# Patient Record
Sex: FEMALE | Race: WHITE | NOT HISPANIC OR LATINO | Employment: STUDENT | URBAN - METROPOLITAN AREA
[De-identification: names, ages, dates, MRNs, and addresses within clinical notes are randomized per-mention and may not be internally consistent; named-entity substitution may affect disease eponyms.]

---
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
## 2019-01-16 DIAGNOSIS — R53.83 FATIGUE: ICD-10-CM

## 2019-01-16 LAB
FERRITIN SERPL-MCNC: 44 NG/ML (ref 12–150)
HGB BLD-MCNC: 12.9 G/DL (ref 11.7–15.7)

## 2019-10-31 DIAGNOSIS — R53.83 FATIGUE, UNSPECIFIED TYPE: Primary | ICD-10-CM

## 2019-11-03 ENCOUNTER — HEALTH MAINTENANCE LETTER (OUTPATIENT)
Age: 19
End: 2019-11-03

## 2019-11-04 DIAGNOSIS — R53.83 FATIGUE, UNSPECIFIED TYPE: ICD-10-CM

## 2019-11-04 LAB
FERRITIN SERPL-MCNC: 40 NG/ML (ref 12–150)
HGB BLD-MCNC: 14.2 G/DL (ref 11.7–15.7)

## 2020-09-11 DIAGNOSIS — Z11.59 SPECIAL SCREENING EXAMINATION FOR VIRAL DISEASE: ICD-10-CM

## 2020-09-12 LAB
SARS-COV-2 RNA SPEC QL NAA+PROBE: NOT DETECTED
SPECIMEN SOURCE: NORMAL

## 2020-09-13 DIAGNOSIS — Z20.822 COVID-19 RULED OUT: Primary | ICD-10-CM

## 2020-09-13 LAB
COVID-19 ANTIBODY IGG: NEGATIVE
LAB TEST METHOD: NORMAL
SARS-COV-2 RNA SPEC QL NAA+PROBE: NOT DETECTED
SPECIMEN SOURCE: NORMAL

## 2020-11-16 ENCOUNTER — HEALTH MAINTENANCE LETTER (OUTPATIENT)
Age: 20
End: 2020-11-16

## 2021-01-15 ENCOUNTER — HEALTH MAINTENANCE LETTER (OUTPATIENT)
Age: 21
End: 2021-01-15

## 2021-03-17 ENCOUNTER — OFFICE VISIT (OUTPATIENT)
Dept: ORTHOPEDICS | Facility: CLINIC | Age: 21
End: 2021-03-17
Payer: COMMERCIAL

## 2021-03-17 VITALS
HEIGHT: 66 IN | DIASTOLIC BLOOD PRESSURE: 80 MMHG | HEART RATE: 75 BPM | BODY MASS INDEX: 21.53 KG/M2 | SYSTOLIC BLOOD PRESSURE: 119 MMHG | WEIGHT: 134 LBS

## 2021-03-17 DIAGNOSIS — R53.83 FATIGUE, UNSPECIFIED TYPE: Primary | ICD-10-CM

## 2021-03-17 DIAGNOSIS — R53.83 FATIGUE, UNSPECIFIED TYPE: ICD-10-CM

## 2021-03-17 LAB
BASOPHILS # BLD AUTO: 0 10E9/L (ref 0–0.2)
BASOPHILS NFR BLD AUTO: 0.6 %
DIFFERENTIAL METHOD BLD: NORMAL
EOSINOPHIL # BLD AUTO: 0.1 10E9/L (ref 0–0.7)
EOSINOPHIL NFR BLD AUTO: 1.5 %
ERYTHROCYTE [DISTWIDTH] IN BLOOD BY AUTOMATED COUNT: 12.1 % (ref 10–15)
FERRITIN SERPL-MCNC: 58 NG/ML (ref 12–150)
HCT VFR BLD AUTO: 37.7 % (ref 35–47)
HGB BLD-MCNC: 12.9 G/DL (ref 11.7–15.7)
IMM GRANULOCYTES # BLD: 0 10E9/L (ref 0–0.4)
IMM GRANULOCYTES NFR BLD: 0.2 %
LYMPHOCYTES # BLD AUTO: 2.2 10E9/L (ref 0.8–5.3)
LYMPHOCYTES NFR BLD AUTO: 40.6 %
MCH RBC QN AUTO: 30 PG (ref 26.5–33)
MCHC RBC AUTO-ENTMCNC: 34.2 G/DL (ref 31.5–36.5)
MCV RBC AUTO: 88 FL (ref 78–100)
MONOCYTES # BLD AUTO: 0.4 10E9/L (ref 0–1.3)
MONOCYTES NFR BLD AUTO: 7.5 %
NEUTROPHILS # BLD AUTO: 2.7 10E9/L (ref 1.6–8.3)
NEUTROPHILS NFR BLD AUTO: 49.6 %
NRBC # BLD AUTO: 0 10*3/UL
NRBC BLD AUTO-RTO: 0 /100
PLATELET # BLD AUTO: 243 10E9/L (ref 150–450)
RBC # BLD AUTO: 4.3 10E12/L (ref 3.8–5.2)
TSH SERPL DL<=0.005 MIU/L-ACNC: 1.5 MU/L (ref 0.4–4)
WBC # BLD AUTO: 5.3 10E9/L (ref 4–11)

## 2021-03-17 PROCEDURE — 36415 COLL VENOUS BLD VENIPUNCTURE: CPT | Performed by: PATHOLOGY

## 2021-03-17 PROCEDURE — 84443 ASSAY THYROID STIM HORMONE: CPT | Performed by: PATHOLOGY

## 2021-03-17 PROCEDURE — 85025 COMPLETE CBC W/AUTO DIFF WBC: CPT | Performed by: PATHOLOGY

## 2021-03-17 PROCEDURE — 82728 ASSAY OF FERRITIN: CPT | Performed by: PATHOLOGY

## 2021-03-17 ASSESSMENT — MIFFLIN-ST. JEOR: SCORE: 1389.57

## 2021-03-17 NOTE — PROGRESS NOTES
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 No change to her training regime. Feels fatigued in general, but especially earlier into a run than usual. No associated symptoms: no fever, chest pain, SOB, palpitations, bowel or bladder changes, rash, visual changes. Appetite ok - attending to nutrition - does not restrict any food groups - no concerns with body image. Met with nutritionist last fall. Thinks she is getting adequate hydration. Only gets 7 hours of sleep per night (due to homework), usually sleeps well. No FHx thyroid disorder. Periods normal on OCP taken for acne. Just switched this to new OCP in December and it has worked better for her acne. Not sure which one she takes.          Medications:     Current Outpatient Medications   Medication Sig     cetirizine (ZYRTEC) 10 MG tablet Take 10 mg by mouth daily     clindamycin (CLINDAMAX) 1 % topical gel Apply topically 2 times daily     OMEPRAZOLE PO Take by mouth as needed     No current facility-administered medications for this visit.              Allergies:      Allergies   Allergen Reactions     Pseudoephedrine      Causes hyperactivity     Strawberries [Strawberry] Rash            Review of Systems:   A comprehensive 10 point review of systems (constitutional, ENT, cardiac, peripheral vascular, respiratory, GI, , Musculoskeletal, skin, Neurological) was performed and found to be negative except as described in this note.           Physical Exam:   COMPLETE EXAMINATION:   VITAL SIGNS: /80   Pulse 75   Ht 1.676 m (5' 6\")   Wt 60.8 kg (134 lb)   BMI 21.63 kg/m    GEN: Alert, well-nourished, and in no distress.   HEENT:   Head: Normocephalic and atraumatic.   Eyes: PERRLA, EOMI  Nose: no congestion or " discharge  Mouth/Throat: MMM, No erythema or exudate.   Neck: supple, no lymphadenopathy, + fullness in anterior neck which may be normal soft tissues vs thyromegaly, but no palpable thyroid nodules  CV: S1S2 normal, RRR, no murmur  CHEST: CTA, Easy effort, No rales or wheezes  ABD: Soft. Nontender/nondistended, no HSM/mass, no rebound/guarding.  NEURO: Alert and oriented to person, place, and time. Gait normal.  SKIN: No rash, warmth or erythema  PSYCH: Mood, memory, affect and judgment normal.           Assessment:     Fatigue - likely due to overtraining syndrome, r/o metabolic abnormality        Plan:     1. Obtain screening labs: CBC, ferritin, TSH, vit D  2. Ensure hydration, nutrition and sleep. Increase to 8 hours sleep per night.  3. Reconnect with nutritionist.  4. Ensure adequate rest days. Follow up prn.    Lilia Burnette M.D.    ARETHA Sood was present for the entire visit.

## 2021-03-17 NOTE — LETTER
Date:March 18, 2021      Patient was self referred, no letter generated. Do not send.        [facility] Health Information

## 2021-03-17 NOTE — LETTER
"  3/17/2021      RE: Anita Constantino  [address] [address]       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 No change to her training regime. Feels fatigued in general, but especially earlier into a run than usual. No associated symptoms: no fever, chest pain, SOB, palpitations, bowel or bladder changes, rash, visual changes. Appetite ok - attending to nutrition - does not restrict any food groups - no concerns with body image. Met with nutritionist last fall. Thinks she is getting adequate hydration. Only gets 7 hours of sleep per night (due to homework), usually sleeps well. No FHx thyroid disorder. Periods normal on OCP taken for acne. Just switched this to new OCP in December and it has worked better for her acne. Not sure which one she takes.          Medications:     Current Outpatient Medications   Medication Sig     cetirizine (ZYRTEC) 10 MG tablet Take 10 mg by mouth daily     clindamycin (CLINDAMAX) 1 % topical gel Apply topically 2 times daily     OMEPRAZOLE PO Take by mouth as needed     No current facility-administered medications for this visit.              Allergies:      Allergies   Allergen Reactions     Pseudoephedrine      Causes hyperactivity     Strawberries [Strawberry] Rash            Review of Systems:   A comprehensive 10 point review of systems (constitutional, ENT, cardiac, peripheral vascular, respiratory, GI, , Musculoskeletal, skin, Neurological) was performed and found to be negative except as described in this note.           Physical Exam:   COMPLETE EXAMINATION:   VITAL SIGNS: /80   Pulse 75   Ht 1.676 m (5' 6\")   Wt 60.8 kg (134 lb)   BMI 21.63 kg/m    GEN: Alert, well-nourished, and in no distress. "   HEENT:   Head: Normocephalic and atraumatic.   Eyes: PERRLA, EOMI  Nose: no congestion or discharge  Mouth/Throat: MMM, No erythema or exudate.   Neck: supple, no lymphadenopathy, + fullness in anterior neck which may be normal soft tissues vs thyromegaly, but no palpable thyroid nodules  CV: S1S2 normal, RRR, no murmur  CHEST: CTA, Easy effort, No rales or wheezes  ABD: Soft. Nontender/nondistended, no HSM/mass, no rebound/guarding.  NEURO: Alert and oriented to person, place, and time. Gait normal.  SKIN: No rash, warmth or erythema  PSYCH: Mood, memory, affect and judgment normal.           Assessment:     Fatigue - likely due to overtraining syndrome, r/o metabolic abnormality        Plan:     1. Obtain screening labs: CBC, ferritin, TSH, vit D  2. Ensure hydration, nutrition and sleep. Increase to 8 hours sleep per night.  3. Reconnect with nutritionist.  4. Ensure adequate rest days. Follow up prn.    Lilia Burnette M.D.    ARETHA Sood was present for the entire visit.        Lilia Burnette MD

## 2021-03-18 LAB — DEPRECATED CALCIDIOL+CALCIFEROL SERPL-MC: 47 UG/L (ref 20–75)

## 2021-04-09 ENCOUNTER — OFFICE VISIT (OUTPATIENT)
Dept: FAMILY MEDICINE | Facility: CLINIC | Age: 21
End: 2021-04-09
Payer: COMMERCIAL

## 2021-04-09 VITALS
DIASTOLIC BLOOD PRESSURE: 74 MMHG | WEIGHT: 132 LBS | SYSTOLIC BLOOD PRESSURE: 120 MMHG | HEART RATE: 75 BPM | HEIGHT: 66 IN | BODY MASS INDEX: 21.21 KG/M2

## 2021-04-09 DIAGNOSIS — M72.2 PLANTAR FASCIITIS: ICD-10-CM

## 2021-04-09 DIAGNOSIS — M76.822 POSTERIOR TIBIAL TENDINITIS, LEFT: Primary | ICD-10-CM

## 2021-04-09 RX ORDER — DICLOFENAC SODIUM 75 MG/1
75 TABLET, DELAYED RELEASE ORAL 2 TIMES DAILY
Qty: 40 TABLET | Refills: 0 | Status: SHIPPED | OUTPATIENT
Start: 2021-04-09

## 2021-04-09 ASSESSMENT — MIFFLIN-ST. JEOR: SCORE: 1380.5

## 2021-04-09 NOTE — PROGRESS NOTES
HISTORY OF PRESENT ILLNESS  Ms. Constantino is a pleasant 20 year old year old female who presents to clinic today with left foot pain  Anita explains that about a week ago she started having left arch pain with her running   Has been running in spikes more often with competition  Typically wears superfeet in her shoes  Location: left foot  Quality:  achy pain    Severity: 5/10 at worst    Duration: about a week  Timing: occurs intermittently  Context: occurs while running  Modifying factors:  resting and non-use makes it better, movement and use makes it worse  Associated signs & symptoms: some swelling  MEDICAL HISTORY  There is no problem list on file for this patient.      Current Outpatient Medications   Medication Sig Dispense Refill     cetirizine (ZYRTEC) 10 MG tablet Take 10 mg by mouth daily       clindamycin (CLINDAMAX) 1 % topical gel Apply topically 2 times daily       OMEPRAZOLE PO Take by mouth as needed         Allergies   Allergen Reactions     Pseudoephedrine      Causes hyperactivity     Strawberries [[place]] Rash       Family History   Problem Relation Age of Onset     Hypertension Mother      Hyperlipidemia Mother      Asthma Father      Hyperlipidemia Maternal Grandmother      Skin Cancer Maternal Grandmother      Alzheimer Disease Maternal Grandfather      Pacemaker Paternal Grandmother      Pacemaker Paternal Grandfather      Social History     Socioeconomic History     Marital status: Single     Spouse name: None     Number of children: None     Years of education: None     Highest education level: None   Occupational History     None   Social Needs     Financial resource strain: None     Food insecurity     Worry: None     Inability: None     Transportation needs     Medical: None     Non-medical: None   Tobacco Use     Smoking status: Never Smoker     Smokeless tobacco: Never Used   Substance and Sexual Activity     Alcohol use: No     Drug use: No     Sexual activity: Never   Lifestyle  "    Physical activity     Days per week: None     Minutes per session: None     Stress: None   Relationships     Social connections     Talks on phone: None     Gets together: None     Attends [employer] service: None     Active member of club or organization: None     Attends meetings of clubs or organizations: None     Relationship status: None     Intimate partner violence     Fear of current or ex partner: None     Emotionally abused: None     Physically abused: None     Forced sexual activity: None   Other Topics Concern     None   Social History Narrative     None       Additional medical/Social/Surgical histories reviewed in EPIC and updated as appropriate.     REVIEW OF SYSTEMS (4/9/2021)  10 point ROS of systems including Constitutional, Eyes, Respiratory, Cardiovascular, Gastroenterology, Genitourinary, Integumentary, Musculoskeletal, Psychiatric, Allergic/Immunologic were all negative except for pertinent positives noted in my HPI.     PHYSICAL EXAM  Vitals:    04/09/21 1036   BP: 120/74   Pulse: 75   Weight: 59.9 kg (132 lb)   Height: 1.676 m (5' 6\")     Vital Signs: /74   Pulse 75   Ht 1.676 m (5' 6\")   Wt 59.9 kg (132 lb)   BMI 21.31 kg/m   Patient declined being weighed. Body mass index is 21.31 kg/m .    General  - normal appearance, in no obvious distress  HEENT  - conjunctivae not injected, moist mucous membranes, normocephalic/atraumatic head, ears normal appearance, no lesions, mouth normal appearance, no scars, normal dentition and teeth present  CV  - normal pulses at posterior tib and dorsalis pedis  Pulm  - normal respiratory pattern, non-labored  Musculoskeletal - left foot  - stance: normal gait without limp, normal stance without excessive pronation, normal heel inversion with standing heel raise, no obvious leg length discrepancy, normal heel and toe walk  - inspection: no swelling or effusion,  normal bone and joint alignment, no obvious deformity, has high arches bilaterally  - " palpation: no bony or soft tissue tenderness, except over posterior tibial tendons at arch and some at plantar fascia  - ROM: normal active and passive ROM of great and lesser toes, no pain with MT translation  - strength: 5/5 in all planes  Neuro  - no sensory or motor deficit, grossly normal coordination, normal muscle tone  Skin  - no ecchymosis, erythema, warmth, or induration, no obvious rash  Psych  - interactive, appropriate, normal mood and affect  ASSESSMENT & PLAN  19 yo female with left foot plantar fasciitis and posterior tibial tendinitis  Reviewed plan to use some taping and arch supports for spikes and running shoes  Ice pRN  Lidocaine patches PRN  voltaren bid PRN  F/u 2 weeks consider xrays if not improving    Appropriate PPE was utilized for prevention of spread of Covid-19.  Ananth Soliz MD, CAQSM

## 2021-04-09 NOTE — LETTER
4/9/2021      RE: Anita Constantino  [address]  [facility] [address]       HISTORY OF PRESENT ILLNESS  Ms. Constantino is a pleasant 20 year old year old female who presents to clinic today with left foot pain  Anita explains that about a week ago she started having left arch pain with her running   Has been running in spikes more often with competition  Typically wears superfeet in her shoes  Location: left foot  Quality:  achy pain    Severity: 5/10 at worst    Duration: about a week  Timing: occurs intermittently  Context: occurs while running  Modifying factors:  resting and non-use makes it better, movement and use makes it worse  Associated signs & symptoms: some swelling  MEDICAL HISTORY  There is no problem list on file for this patient.      Current Outpatient Medications   Medication Sig Dispense Refill     cetirizine (ZYRTEC) 10 MG tablet Take 10 mg by mouth daily       clindamycin (CLINDAMAX) 1 % topical gel Apply topically 2 times daily       OMEPRAZOLE PO Take by mouth as needed         Allergies   Allergen Reactions     Pseudoephedrine      Causes hyperactivity     Strawberries [[place]] Rash       Family History   Problem Relation Age of Onset     Hypertension Mother      Hyperlipidemia Mother      Asthma Father      Hyperlipidemia Maternal Grandmother      Skin Cancer Maternal Grandmother      Alzheimer Disease Maternal Grandfather      Pacemaker Paternal Grandmother      Pacemaker Paternal Grandfather      Social History     Socioeconomic History     Marital status: Single     Spouse name: None     Number of children: None     Years of education: None     Highest education level: None   Occupational History     None   Social Needs     Financial resource strain: None     Food insecurity     Worry: None     Inability: None     Transportation needs     Medical: None     Non-medical: None   Tobacco Use     Smoking status: Never Smoker     Smokeless tobacco: Never Used   Substance  "and Sexual Activity     Alcohol use: No     Drug use: No     Sexual activity: Never   Lifestyle     Physical activity     Days per week: None     Minutes per session: None     Stress: None   Relationships     Social connections     Talks on phone: None     Gets together: None     Attends [employer] service: None     Active member of club or organization: None     Attends meetings of clubs or organizations: None     Relationship status: None     Intimate partner violence     Fear of current or ex partner: None     Emotionally abused: None     Physically abused: None     Forced sexual activity: None   Other Topics Concern     None   Social History Narrative     None       Additional medical/Social/Surgical histories reviewed in Psychiatric and updated as appropriate.     REVIEW OF SYSTEMS (4/9/2021)  10 point ROS of systems including Constitutional, Eyes, Respiratory, Cardiovascular, Gastroenterology, Genitourinary, Integumentary, Musculoskeletal, Psychiatric, Allergic/Immunologic were all negative except for pertinent positives noted in my HPI.     PHYSICAL EXAM  Vitals:    04/09/21 1036   BP: 120/74   Pulse: 75   Weight: 59.9 kg (132 lb)   Height: 1.676 m (5' 6\")     Vital Signs: /74   Pulse 75   Ht 1.676 m (5' 6\")   Wt 59.9 kg (132 lb)   BMI 21.31 kg/m   Patient declined being weighed. Body mass index is 21.31 kg/m .    General  - normal appearance, in no obvious distress  HEENT  - conjunctivae not injected, moist mucous membranes, normocephalic/atraumatic head, ears normal appearance, no lesions, mouth normal appearance, no scars, normal dentition and teeth present  CV  - normal pulses at posterior tib and dorsalis pedis  Pulm  - normal respiratory pattern, non-labored  Musculoskeletal - left foot  - stance: normal gait without limp, normal stance without excessive pronation, normal heel inversion with standing heel raise, no obvious leg length discrepancy, normal heel and toe walk  - inspection: no swelling or " effusion,  normal bone and joint alignment, no obvious deformity, has high arches bilaterally  - palpation: no bony or soft tissue tenderness, except over posterior tibial tendons at arch and some at plantar fascia  - ROM: normal active and passive ROM of great and lesser toes, no pain with MT translation  - strength: 5/5 in all planes  Neuro  - no sensory or motor deficit, grossly normal coordination, normal muscle tone  Skin  - no ecchymosis, erythema, warmth, or induration, no obvious rash  Psych  - interactive, appropriate, normal mood and affect  ASSESSMENT & PLAN  21 yo female with left foot plantar fasciitis and posterior tibial tendinitis  Reviewed plan to use some taping and arch supports for spikes and running shoes  Ice pRN  Lidocaine patches PRN  voltaren bid PRN  F/u 2 weeks consider xrays if not improving    Appropriate PPE was utilized for prevention of spread of Covid-19.  Ananth Soliz MD, CAQSM

## 2021-04-23 ENCOUNTER — OFFICE VISIT (OUTPATIENT)
Dept: FAMILY MEDICINE | Facility: CLINIC | Age: 21
End: 2021-04-23
Payer: COMMERCIAL

## 2021-04-23 ENCOUNTER — ANCILLARY PROCEDURE (OUTPATIENT)
Dept: GENERAL RADIOLOGY | Facility: CLINIC | Age: 21
End: 2021-04-23
Attending: PREVENTIVE MEDICINE
Payer: COMMERCIAL

## 2021-04-23 VITALS
HEART RATE: 69 BPM | SYSTOLIC BLOOD PRESSURE: 122 MMHG | BODY MASS INDEX: 20.91 KG/M2 | WEIGHT: 133.2 LBS | DIASTOLIC BLOOD PRESSURE: 77 MMHG | HEIGHT: 67 IN

## 2021-04-23 DIAGNOSIS — M79.672 LEFT FOOT PAIN: ICD-10-CM

## 2021-04-23 DIAGNOSIS — M76.822 POSTERIOR TIBIAL TENDINITIS OF LEFT LOWER EXTREMITY: ICD-10-CM

## 2021-04-23 DIAGNOSIS — M79.672 LEFT FOOT PAIN: Primary | ICD-10-CM

## 2021-04-23 PROCEDURE — 73630 X-RAY EXAM OF FOOT: CPT | Mod: LT | Performed by: RADIOLOGY

## 2021-04-23 RX ORDER — METHYLPREDNISOLONE 4 MG
TABLET, DOSE PACK ORAL
Qty: 21 TABLET | Refills: 0 | Status: SHIPPED | OUTPATIENT
Start: 2021-04-23

## 2021-04-23 ASSESSMENT — MIFFLIN-ST. JEOR: SCORE: 1401.82

## 2021-04-23 NOTE — LETTER
4/23/2021      RE: Anita Constantino  [address] [facility] [address]       HISTORY OF PRESENT ILLNESS  Ms. Constantino is a pleasant 20 year old year old female who presents to clinic today with ongoing left foot and ankle pain  Anita explains that she has been using voltaren that helps a little, reduced her running, but continues to have pain in the ankle/foot  Location: left foot/ankle  Quality:  achy pain    Severity: 6/10 at worst    Duration: over a month  Timing: occurs intermittently  Context: occurs while exercising and lifting  Modifying factors:  resting and non-use makes it better, movement and use makes it worse  Associated signs & symptoms: still having swelling at times    MEDICAL HISTORY  There is no problem list on file for this patient.      Current Outpatient Medications   Medication Sig Dispense Refill     cetirizine (ZYRTEC) 10 MG tablet Take 10 mg by mouth daily       clindamycin (CLINDAMAX) 1 % topical gel Apply topically 2 times daily       diclofenac (VOLTAREN) 75 MG EC tablet Take 1 tablet (75 mg) by mouth 2 times daily 40 tablet 0     OMEPRAZOLE PO Take by mouth as needed         Allergies   Allergen Reactions     Pseudoephedrine      Causes hyperactivity     Strawberries [[place]] Rash       Family History   Problem Relation Age of Onset     Hypertension Mother      Hyperlipidemia Mother      Asthma Father      Hyperlipidemia Maternal Grandmother      Skin Cancer Maternal Grandmother      Alzheimer Disease Maternal Grandfather      Pacemaker Paternal Grandmother      Pacemaker Paternal Grandfather      Social History     Socioeconomic History     Marital status: Single     Spouse name: Not on file     Number of children: Not on file     Years of education: Not on file     Highest education level: Not on file   Occupational History     Not on file   Social Needs     Financial resource strain: Not on file     Food insecurity     Worry: Not on file     Inability: Not on  "file     Transportation needs     Medical: Not on file     Non-medical: Not on file   Tobacco Use     Smoking status: Never Smoker     Smokeless tobacco: Never Used   Substance and Sexual Activity     Alcohol use: No     Drug use: No     Sexual activity: Never   Lifestyle     Physical activity     Days per week: Not on file     Minutes per session: Not on file     Stress: Not on file   Relationships     Social connections     Talks on phone: Not on file     Gets together: Not on file     Attends Pentecostalism service: Not on file     Active member of club or organization: Not on file     Attends meetings of clubs or organizations: Not on file     Relationship status: Not on file     Intimate partner violence     Fear of current or ex partner: Not on file     Emotionally abused: Not on file     Physically abused: Not on file     Forced sexual activity: Not on file   Other Topics Concern     Not on file   Social History Narrative     Not on file       Additional medical/Social/Surgical histories reviewed in [facility] and updated as appropriate.     REVIEW OF SYSTEMS (4/23/2021)  10 point ROS of systems including Constitutional, Eyes, Respiratory, Cardiovascular, Gastroenterology, Genitourinary, Integumentary, Musculoskeletal, Psychiatric, Allergic/Immunologic were all negative except for pertinent positives noted in my HPI.     PHYSICAL EXAM  Vitals:    04/23/21 0834   BP: 122/77   Pulse: 69   Weight: 60.4 kg (133 lb 3.2 oz)   Height: 1.702 m (5' 7\")     Vital Signs: /77   Pulse 69   Ht 1.702 m (5' 7\")   Wt 60.4 kg (133 lb 3.2 oz)   BMI 20.86 kg/m   Patient declined being weighed. Body mass index is 20.86 kg/m .    General  - normal appearance, in no obvious distress  HEENT  - conjunctivae not injected, moist mucous membranes, normocephalic/atraumatic head, ears normal appearance, no lesions, mouth normal appearance, no scars, normal dentition and teeth present  CV  - normal pulses at posterior tib and dorsalis " pedis  Pulm  - normal respiratory pattern, non-labored  Musculoskeletal - left ankle  - stance: gait does not favors affected side  - inspection: no swelling laterally, normal bone and joint alignment, no obvious deformity  - palpation: tenderness over posterior tibial tendon to palpation and midfoot, no tenderness over lateral or medial malleoli, navicular, or base of 5th MT  - ROM: intact globally but NOT limited secondary to pain  - strength: 4/5 in eversion, 5/5 in all other planes  - special tests:  pain with eversion stress  (-) anterior drawer  (-) talar tilt  (-) Tinel's  (-) squeeze test  (-) Wright test  Neuro  - no sensory or motor deficit, grossly normal coordination, normal muscle tone  Skin  - no ecchymosis overlying lateral foot-ankle junction, no warmth or induration, no obvious rash  Psych  - interactive, appropriate, normal mood and affect  ASSESSMENT & PLAN  21 yo female with posterior tibial tendinitis, and midfoot pain  Reviewed plan to use medrol pack  Ordered xrays foot  F/u in 1-2 weeks  Will consider injection for posterior tibial tendon  Appropriate PPE was utilized for prevention of spread of Covid-19.  Ananth Soliz MD, CAQSM        Ananth Soliz MD

## 2021-04-23 NOTE — PROGRESS NOTES
HISTORY OF PRESENT ILLNESS  Ms. Constantino is a pleasant 20 year old year old female who presents to clinic today with ongoing left foot and ankle pain  Anita explains that she has been using voltaren that helps a little, reduced her running, but continues to have pain in the ankle/foot  Location: left foot/ankle  Quality:  achy pain    Severity: 6/10 at worst    Duration: over a month  Timing: occurs intermittently  Context: occurs while exercising and lifting  Modifying factors:  resting and non-use makes it better, movement and use makes it worse  Associated signs & symptoms: still having swelling at times    MEDICAL HISTORY  There is no problem list on file for this patient.      Current Outpatient Medications   Medication Sig Dispense Refill     cetirizine (ZYRTEC) 10 MG tablet Take 10 mg by mouth daily       clindamycin (CLINDAMAX) 1 % topical gel Apply topically 2 times daily       diclofenac (VOLTAREN) 75 MG EC tablet Take 1 tablet (75 mg) by mouth 2 times daily 40 tablet 0     OMEPRAZOLE PO Take by mouth as needed         Allergies   Allergen Reactions     Pseudoephedrine      Causes hyperactivity     Strawberries [[place]] Rash       Family History   Problem Relation Age of Onset     Hypertension Mother      Hyperlipidemia Mother      Asthma Father      Hyperlipidemia Maternal Grandmother      Skin Cancer Maternal Grandmother      Alzheimer Disease Maternal Grandfather      Pacemaker Paternal Grandmother      Pacemaker Paternal Grandfather      Social History     Socioeconomic History     Marital status: Single     Spouse name: Not on file     Number of children: Not on file     Years of education: Not on file     Highest education level: Not on file   Occupational History     Not on file   Social Needs     Financial resource strain: Not on file     Food insecurity     Worry: Not on file     Inability: Not on file     Transportation needs     Medical: Not on file     Non-medical: Not on file   Tobacco  "Use     Smoking status: Never Smoker     Smokeless tobacco: Never Used   Substance and Sexual Activity     Alcohol use: No     Drug use: No     Sexual activity: Never   Lifestyle     Physical activity     Days per week: Not on file     Minutes per session: Not on file     Stress: Not on file   Relationships     Social connections     Talks on phone: Not on file     Gets together: Not on file     Attends Jainism service: Not on file     Active member of club or organization: Not on file     Attends meetings of clubs or organizations: Not on file     Relationship status: Not on file     Intimate partner violence     Fear of current or ex partner: Not on file     Emotionally abused: Not on file     Physically abused: Not on file     Forced sexual activity: Not on file   Other Topics Concern     Not on file   Social History Narrative     Not on file       Additional medical/Social/Surgical histories reviewed in [facility] and updated as appropriate.     REVIEW OF SYSTEMS (4/23/2021)  10 point ROS of systems including Constitutional, Eyes, Respiratory, Cardiovascular, Gastroenterology, Genitourinary, Integumentary, Musculoskeletal, Psychiatric, Allergic/Immunologic were all negative except for pertinent positives noted in my HPI.     PHYSICAL EXAM  Vitals:    04/23/21 0834   BP: 122/77   Pulse: 69   Weight: 60.4 kg (133 lb 3.2 oz)   Height: 1.702 m (5' 7\")     Vital Signs: /77   Pulse 69   Ht 1.702 m (5' 7\")   Wt 60.4 kg (133 lb 3.2 oz)   BMI 20.86 kg/m   Patient declined being weighed. Body mass index is 20.86 kg/m .    General  - normal appearance, in no obvious distress  HEENT  - conjunctivae not injected, moist mucous membranes, normocephalic/atraumatic head, ears normal appearance, no lesions, mouth normal appearance, no scars, normal dentition and teeth present  CV  - normal pulses at posterior tib and dorsalis pedis  Pulm  - normal respiratory pattern, non-labored  Musculoskeletal - left ankle  - stance: " gait does not favors affected side  - inspection: no swelling laterally, normal bone and joint alignment, no obvious deformity  - palpation: tenderness over posterior tibial tendon to palpation and midfoot, no tenderness over lateral or medial malleoli, navicular, or base of 5th MT  - ROM: intact globally but NOT limited secondary to pain  - strength: 4/5 in eversion, 5/5 in all other planes  - special tests:  pain with eversion stress  (-) anterior drawer  (-) talar tilt  (-) Tinel's  (-) squeeze test  (-) Wright test  Neuro  - no sensory or motor deficit, grossly normal coordination, normal muscle tone  Skin  - no ecchymosis overlying lateral foot-ankle junction, no warmth or induration, no obvious rash  Psych  - interactive, appropriate, normal mood and affect  ASSESSMENT & PLAN  21 yo female with posterior tibial tendinitis, and midfoot pain  Reviewed plan to use medrol pack  Ordered xrays foot  F/u in 1-2 weeks  Will consider injection for posterior tibial tendon  Appropriate PPE was utilized for prevention of spread of Covid-19.  Ananth Soliz MD, CAQSM

## 2021-05-07 ENCOUNTER — OFFICE VISIT (OUTPATIENT)
Dept: FAMILY MEDICINE | Facility: CLINIC | Age: 21
End: 2021-05-07
Payer: COMMERCIAL

## 2021-05-07 VITALS
BODY MASS INDEX: 20.4 KG/M2 | SYSTOLIC BLOOD PRESSURE: 124 MMHG | HEIGHT: 67 IN | HEART RATE: 99 BPM | WEIGHT: 130 LBS | DIASTOLIC BLOOD PRESSURE: 75 MMHG

## 2021-05-07 DIAGNOSIS — M72.2 PLANTAR FASCIITIS: ICD-10-CM

## 2021-05-07 DIAGNOSIS — M76.822 POSTERIOR TIBIAL TENDINITIS, LEFT: Primary | ICD-10-CM

## 2021-05-07 DIAGNOSIS — M79.672 LEFT FOOT PAIN: ICD-10-CM

## 2021-05-07 ASSESSMENT — MIFFLIN-ST. JEOR: SCORE: 1387.31

## 2021-05-07 NOTE — PROGRESS NOTES
HISTORY OF PRESENT ILLNESS  Ms. Constantino is a pleasant 20 year old year old female who presents to clinic today with left foot pain  Anita explains that she has taken medrol pack and this helped a little, but continues to have pain  Using boot 1/2 days  Still has not run  Still has pain with walking  Location: left foot  Quality:  achy pain    Severity: 4/10 at worst    Duration: 6+ weeks    MEDICAL HISTORY  There is no problem list on file for this patient.      Current Outpatient Medications   Medication Sig Dispense Refill     cetirizine (ZYRTEC) 10 MG tablet Take 10 mg by mouth daily       clindamycin (CLINDAMAX) 1 % topical gel Apply topically 2 times daily       diclofenac (VOLTAREN) 75 MG EC tablet Take 1 tablet (75 mg) by mouth 2 times daily 40 tablet 0     methylPREDNISolone (MEDROL DOSEPAK) 4 MG tablet therapy pack Follow Package Directions 21 tablet 0     OMEPRAZOLE PO Take by mouth as needed         Allergies   Allergen Reactions     Pseudoephedrine      Causes hyperactivity     Strawberries [[place]] Rash       Family History   Problem Relation Age of Onset     Hypertension Mother      Hyperlipidemia Mother      Asthma Father      Hyperlipidemia Maternal Grandmother      Skin Cancer Maternal Grandmother      Alzheimer Disease Maternal Grandfather      Pacemaker Paternal Grandmother      Pacemaker Paternal Grandfather      Social History     Socioeconomic History     Marital status: Single     Spouse name: None     Number of children: None     Years of education: None     Highest education level: None   Occupational History     None   Social Needs     Financial resource strain: None     Food insecurity     Worry: None     Inability: None     Transportation needs     Medical: None     Non-medical: None   Tobacco Use     Smoking status: Never Smoker     Smokeless tobacco: Never Used   Substance and Sexual Activity     Alcohol use: No     Drug use: No     Sexual activity: Never   Lifestyle     Physical  "activity     Days per week: None     Minutes per session: None     Stress: None   Relationships     Social connections     Talks on phone: None     Gets together: None     Attends Anabaptism service: None     Active member of club or organization: None     Attends meetings of clubs or organizations: None     Relationship status: None     Intimate partner violence     Fear of current or ex partner: None     Emotionally abused: None     Physically abused: None     Forced sexual activity: None   Other Topics Concern     None   Social History Narrative     None       Additional medical/Social/Surgical histories reviewed in [facility] and updated as appropriate.     REVIEW OF SYSTEMS (5/7/2021)  10 point ROS of systems including Constitutional, Eyes, Respiratory, Cardiovascular, Gastroenterology, Genitourinary, Integumentary, Musculoskeletal, Psychiatric, Allergic/Immunologic were all negative except for pertinent positives noted in my HPI.     PHYSICAL EXAM  Vitals:    05/07/21 1104   BP: 124/75   Pulse: 99   Weight: 59 kg (130 lb)   Height: 1.702 m (5' 7\")     Vital Signs: /75   Pulse 99   Ht 1.702 m (5' 7\")   Wt 59 kg (130 lb)   BMI 20.36 kg/m   Patient declined being weighed. Body mass index is 20.36 kg/m .    General  - normal appearance, in no obvious distress  HEENT  - conjunctivae not injected, moist mucous membranes, normocephalic/atraumatic head, ears normal appearance, no lesions, mouth normal appearance, no scars, normal dentition and teeth present  CV  - normal pulses at posterior tib and dorsalis pedis  Pulm  - normal respiratory pattern, non-labored  Musculoskeletal -left foot/ ankle  - stance: gait does not favors affected side,   - inspection:noswelling laterally, normal bone and joint alignment, no obvious deformity  - palpation: tenderness over medial arch, posterior tibial tendon, no tenderness over lateral or medial malleoli, navicular, or base of 5th MT  - ROM: intact globally but NO limited " secondary to pain  - strength: 5/5 in eversion, 5/5 in all other planes    Neuro  - no sensory or motor deficit, grossly normal coordination, normal muscle tone  Skin  - no ecchymosis overlying lateral foot-ankle junction, no warmth or induration, no obvious rash  Psych  - interactive, appropriate, normal mood and affect  ASSESSMENT & PLAN  21 yo female with posterior tibial tendinitis, left  , not resolved  Reviewed xrays: has high arches  No fractures  Use boot x 10 days  F/u and will discuss MRI  Discussed plan with ATC and athlete    I independently reviewed the following imaging studies:    Appropriate PPE was utilized for prevention of spread of Covid-19.  Ananth Soliz MD, CAQSM

## 2021-05-07 NOTE — LETTER
5/7/2021      RE: Anita Constantino  [address] [facility] [id number]       HISTORY OF PRESENT ILLNESS  Ms. Constantino is a pleasant 20 year old year old female who presents to clinic today with left foot pain  Anita explains that she has taken medrol pack and this helped a little, but continues to have pain  Using boot 1/2 days  Still has not run  Still has pain with walking  Location: left foot  Quality:  achy pain    Severity: 4/10 at worst    Duration: 6+ weeks    MEDICAL HISTORY  There is no problem list on file for this patient.      Current Outpatient Medications   Medication Sig Dispense Refill     cetirizine (ZYRTEC) 10 MG tablet Take 10 mg by mouth daily       clindamycin (CLINDAMAX) 1 % topical gel Apply topically 2 times daily       diclofenac (VOLTAREN) 75 MG EC tablet Take 1 tablet (75 mg) by mouth 2 times daily 40 tablet 0     methylPREDNISolone (MEDROL DOSEPAK) 4 MG tablet therapy pack Follow Package Directions 21 tablet 0     OMEPRAZOLE PO Take by mouth as needed         Allergies   Allergen Reactions     Pseudoephedrine      Causes hyperactivity     Strawberries [[place]] Rash       Family History   Problem Relation Age of Onset     Hypertension Mother      Hyperlipidemia Mother      Asthma Father      Hyperlipidemia Maternal Grandmother      Skin Cancer Maternal Grandmother      Alzheimer Disease Maternal Grandfather      Pacemaker Paternal Grandmother      Pacemaker Paternal Grandfather      Social History     Socioeconomic History     Marital status: Single     Spouse name: None     Number of children: None     Years of education: None     Highest education level: None   Occupational History     None   Social Needs     Financial resource strain: None     Food insecurity     Worry: None     Inability: None     Transportation needs     Medical: None     Non-medical: None   Tobacco Use     Smoking status: Never Smoker     Smokeless tobacco: Never Used   Substance and Sexual  "Activity     Alcohol use: No     Drug use: No     Sexual activity: Never   Lifestyle     Physical activity     Days per week: None     Minutes per session: None     Stress: None   Relationships     Social connections     Talks on phone: None     Gets together: None     Attends Denominational service: None     Active member of club or organization: None     Attends meetings of clubs or organizations: None     Relationship status: None     Intimate partner violence     Fear of current or ex partner: None     Emotionally abused: None     Physically abused: None     Forced sexual activity: None   Other Topics Concern     None   Social History Narrative     None       Additional medical/Social/Surgical histories reviewed in [facility] and updated as appropriate.     REVIEW OF SYSTEMS (5/7/2021)  10 point ROS of systems including Constitutional, Eyes, Respiratory, Cardiovascular, Gastroenterology, Genitourinary, Integumentary, Musculoskeletal, Psychiatric, Allergic/Immunologic were all negative except for pertinent positives noted in my HPI.     PHYSICAL EXAM  Vitals:    05/07/21 1104   BP: 124/75   Pulse: 99   Weight: 59 kg (130 lb)   Height: 1.702 m (5' 7\")     Vital Signs: /75   Pulse 99   Ht 1.702 m (5' 7\")   Wt 59 kg (130 lb)   BMI 20.36 kg/m   Patient declined being weighed. Body mass index is 20.36 kg/m .    General  - normal appearance, in no obvious distress  HEENT  - conjunctivae not injected, moist mucous membranes, normocephalic/atraumatic head, ears normal appearance, no lesions, mouth normal appearance, no scars, normal dentition and teeth present  CV  - normal pulses at posterior tib and dorsalis pedis  Pulm  - normal respiratory pattern, non-labored  Musculoskeletal -left foot/ ankle  - stance: gait does not favors affected side,   - inspection:noswelling laterally, normal bone and joint alignment, no obvious deformity  - palpation: tenderness over medial arch, posterior tibial tendon, no tenderness over " lateral or medial malleoli, navicular, or base of 5th MT  - ROM: intact globally but NO limited secondary to pain  - strength: 5/5 in eversion, 5/5 in all other planes    Neuro  - no sensory or motor deficit, grossly normal coordination, normal muscle tone  Skin  - no ecchymosis overlying lateral foot-ankle junction, no warmth or induration, no obvious rash  Psych  - interactive, appropriate, normal mood and affect  ASSESSMENT & PLAN  21 yo female with posterior tibial tendinitis, left  , not resolved  Reviewed xrays: has high arches  No fractures  Use boot x 10 days  F/u and will discuss MRI  Discussed plan with ATC and athlete    I independently reviewed the following imaging studies:    Appropriate PPE was utilized for prevention of spread of Covid-19.  Ananth Soliz MD, CAQSM        Ananth Soliz MD

## 2021-06-04 ENCOUNTER — OFFICE VISIT (OUTPATIENT)
Dept: FAMILY MEDICINE | Facility: CLINIC | Age: 21
End: 2021-06-04
Payer: COMMERCIAL

## 2021-06-04 VITALS
WEIGHT: 129 LBS | DIASTOLIC BLOOD PRESSURE: 74 MMHG | SYSTOLIC BLOOD PRESSURE: 107 MMHG | BODY MASS INDEX: 20.25 KG/M2 | HEART RATE: 96 BPM | HEIGHT: 67 IN

## 2021-06-04 DIAGNOSIS — M79.672 LEFT FOOT PAIN: ICD-10-CM

## 2021-06-04 DIAGNOSIS — M76.822 POSTERIOR TIBIAL TENDINITIS, LEFT: Primary | ICD-10-CM

## 2021-06-04 ASSESSMENT — MIFFLIN-ST. JEOR: SCORE: 1382.77

## 2021-06-04 NOTE — LETTER
6/4/2021      RE: Anita Constantino  [address] [facility] [address]       HISTORY OF PRESENT ILLNESS  Ms. Constantino is a pleasant 20 year old year old female who presents to clinic today with ongoing left foot pain  Anita explains that she has had some improvement in her pain since the season ended, but still has pain when she tries running and with workouts        MEDICAL HISTORY  There is no problem list on file for this patient.      Current Outpatient Medications   Medication Sig Dispense Refill     cetirizine (ZYRTEC) 10 MG tablet Take 10 mg by mouth daily       OMEPRAZOLE PO Take by mouth as needed       clindamycin (CLINDAMAX) 1 % topical gel Apply topically 2 times daily       diclofenac (VOLTAREN) 75 MG EC tablet Take 1 tablet (75 mg) by mouth 2 times daily (Patient not taking: Reported on 6/4/2021) 40 tablet 0     methylPREDNISolone (MEDROL DOSEPAK) 4 MG tablet therapy pack Follow Package Directions (Patient not taking: Reported on 6/4/2021) 21 tablet 0       Allergies   Allergen Reactions     Pseudoephedrine      Causes hyperactivity     Strawberries [[place]] Rash       Family History   Problem Relation Age of Onset     Hypertension Mother      Hyperlipidemia Mother      Asthma Father      Hyperlipidemia Maternal Grandmother      Skin Cancer Maternal Grandmother      Alzheimer Disease Maternal Grandfather      Pacemaker Paternal Grandmother      Pacemaker Paternal Grandfather      Social History     Socioeconomic History     Marital status: Single     Spouse name: Not on file     Number of children: Not on file     Years of education: Not on file     Highest education level: Not on file   Occupational History     Not on file   Social Needs     Financial resource strain: Not on file     Food insecurity     Worry: Not on file     Inability: Not on file     Transportation needs     Medical: Not on file     Non-medical: Not on file   Tobacco Use     Smoking status: Never Smoker      "Smokeless tobacco: Never Used   Substance and Sexual Activity     Alcohol use: No     Drug use: No     Sexual activity: Never   Lifestyle     Physical activity     Days per week: Not on file     Minutes per session: Not on file     Stress: Not on file   Relationships     Social connections     Talks on phone: Not on file     Gets together: Not on file     Attends Religion service: Not on file     Active member of club or organization: Not on file     Attends meetings of clubs or organizations: Not on file     Relationship status: Not on file     Intimate partner violence     Fear of current or ex partner: Not on file     Emotionally abused: Not on file     Physically abused: Not on file     Forced sexual activity: Not on file   Other Topics Concern     Not on file   Social History Narrative     Not on file       Additional medical/Social/Surgical histories reviewed in [facility] and updated as appropriate.     REVIEW OF SYSTEMS (6/4/2021)  10 point ROS of systems including Constitutional, Eyes, Respiratory, Cardiovascular, Gastroenterology, Genitourinary, Integumentary, Musculoskeletal, Psychiatric, Allergic/Immunologic were all negative except for pertinent positives noted in my HPI.     PHYSICAL EXAM  Vitals:    06/04/21 0935   BP: 107/74   Pulse: 96   Weight: 58.5 kg (129 lb)   Height: 1.702 m (5' 7\")     Vital Signs: /74   Pulse 96   Ht 1.702 m (5' 7\")   Wt 58.5 kg (129 lb)   BMI 20.20 kg/m   Patient declined being weighed. Body mass index is 20.2 kg/m .    General  - normal appearance, in no obvious distress  HEENT  - conjunctivae not injected, moist mucous membranes, normocephalic/atraumatic head, ears normal appearance, no lesions, mouth normal appearance, no scars, normal dentition and teeth present  CV  - normal pulses at posterior tib and dorsalis pedis  Pulm  - normal respiratory pattern, non-labored  Musculoskeletal -left foot  - stance: normal gait without limp, normal stance without excessive " pronation, normal heel inversion with standing heel raise, no obvious leg length discrepancy, normal heel and toe walk  - inspection: no swelling or effusion,  normal bone and joint alignment, no obvious deformity  - palpation: no bony or soft tissue tenderness, except along posterior tibial tendon at attachment at mid foot  - ROM: normal active and passive ROM of great and lesser toes, no pain with MT translation  - strength: 5/5 in all planes  Neuro  - no sensory or motor deficit, grossly normal coordination, normal muscle tone  Skin  - no ecchymosis, erythema, warmth, or induration, no obvious rash  Psych  - interactive, appropriate, normal mood and affect  ASSESSMENT & PLAN  19 yo female with left foot pain due to posterior tibial tendinitis vs. Stress fracture      I independently reviewed the following imaging studies:  Xray foot: shows no significant fractures or dislocations  Discussed and ordered MRI foot  F/u after MRI  Cont activity as tolerated      Appropriate PPE was utilized for prevention of spread of Covid-19.  Ananth Soliz MD, CAM        Ananth Soliz MD

## 2021-06-04 NOTE — PROGRESS NOTES
HISTORY OF PRESENT ILLNESS  Ms. Constantino is a pleasant 20 year old year old female who presents to clinic today with ongoing left foot pain  Anita explains that she has had some improvement in her pain since the season ended, but still has pain when she tries running and with workouts        MEDICAL HISTORY  There is no problem list on file for this patient.      Current Outpatient Medications   Medication Sig Dispense Refill     cetirizine (ZYRTEC) 10 MG tablet Take 10 mg by mouth daily       OMEPRAZOLE PO Take by mouth as needed       clindamycin (CLINDAMAX) 1 % topical gel Apply topically 2 times daily       diclofenac (VOLTAREN) 75 MG EC tablet Take 1 tablet (75 mg) by mouth 2 times daily (Patient not taking: Reported on 6/4/2021) 40 tablet 0     methylPREDNISolone (MEDROL DOSEPAK) 4 MG tablet therapy pack Follow Package Directions (Patient not taking: Reported on 6/4/2021) 21 tablet 0       Allergies   Allergen Reactions     Pseudoephedrine      Causes hyperactivity     Strawberries [[place]] Rash       Family History   Problem Relation Age of Onset     Hypertension Mother      Hyperlipidemia Mother      Asthma Father      Hyperlipidemia Maternal Grandmother      Skin Cancer Maternal Grandmother      Alzheimer Disease Maternal Grandfather      Pacemaker Paternal Grandmother      Pacemaker Paternal Grandfather      Social History     Socioeconomic History     Marital status: Single     Spouse name: Not on file     Number of children: Not on file     Years of education: Not on file     Highest education level: Not on file   Occupational History     Not on file   Social Needs     Financial resource strain: Not on file     Food insecurity     Worry: Not on file     Inability: Not on file     Transportation needs     Medical: Not on file     Non-medical: Not on file   Tobacco Use     Smoking status: Never Smoker     Smokeless tobacco: Never Used   Substance and Sexual Activity     Alcohol use: No     Drug use:  "No     Sexual activity: Never   Lifestyle     Physical activity     Days per week: Not on file     Minutes per session: Not on file     Stress: Not on file   Relationships     Social connections     Talks on phone: Not on file     Gets together: Not on file     Attends Congregation service: Not on file     Active member of club or organization: Not on file     Attends meetings of clubs or organizations: Not on file     Relationship status: Not on file     Intimate partner violence     Fear of current or ex partner: Not on file     Emotionally abused: Not on file     Physically abused: Not on file     Forced sexual activity: Not on file   Other Topics Concern     Not on file   Social History Narrative     Not on file       Additional medical/Social/Surgical histories reviewed in [facility] and updated as appropriate.     REVIEW OF SYSTEMS (6/4/2021)  10 point ROS of systems including Constitutional, Eyes, Respiratory, Cardiovascular, Gastroenterology, Genitourinary, Integumentary, Musculoskeletal, Psychiatric, Allergic/Immunologic were all negative except for pertinent positives noted in my HPI.     PHYSICAL EXAM  Vitals:    06/04/21 0935   BP: 107/74   Pulse: 96   Weight: 58.5 kg (129 lb)   Height: 1.702 m (5' 7\")     Vital Signs: /74   Pulse 96   Ht 1.702 m (5' 7\")   Wt 58.5 kg (129 lb)   BMI 20.20 kg/m   Patient declined being weighed. Body mass index is 20.2 kg/m .    General  - normal appearance, in no obvious distress  HEENT  - conjunctivae not injected, moist mucous membranes, normocephalic/atraumatic head, ears normal appearance, no lesions, mouth normal appearance, no scars, normal dentition and teeth present  CV  - normal pulses at posterior tib and dorsalis pedis  Pulm  - normal respiratory pattern, non-labored  Musculoskeletal -left foot  - stance: normal gait without limp, normal stance without excessive pronation, normal heel inversion with standing heel raise, no obvious leg length discrepancy, normal " heel and toe walk  - inspection: no swelling or effusion,  normal bone and joint alignment, no obvious deformity  - palpation: no bony or soft tissue tenderness, except along posterior tibial tendon at attachment at mid foot  - ROM: normal active and passive ROM of great and lesser toes, no pain with MT translation  - strength: 5/5 in all planes  Neuro  - no sensory or motor deficit, grossly normal coordination, normal muscle tone  Skin  - no ecchymosis, erythema, warmth, or induration, no obvious rash  Psych  - interactive, appropriate, normal mood and affect  ASSESSMENT & PLAN  19 yo female with left foot pain due to posterior tibial tendinitis vs. Stress fracture      I independently reviewed the following imaging studies:  Xray foot: shows no significant fractures or dislocations  Discussed and ordered MRI foot  F/u after MRI  Cont activity as tolerated      Appropriate PPE was utilized for prevention of spread of Covid-19.  Ananth Soliz MD, CAQSM

## 2021-06-04 NOTE — LETTER
Date:Lcurecia 15, 2021      Patient was self referred, no letter generated. Do not send.        [facility] Health Information

## 2021-06-06 ENCOUNTER — ANCILLARY PROCEDURE (OUTPATIENT)
Dept: MRI IMAGING | Facility: CLINIC | Age: 21
End: 2021-06-06
Attending: PREVENTIVE MEDICINE
Payer: COMMERCIAL

## 2021-06-06 DIAGNOSIS — M79.672 LEFT FOOT PAIN: ICD-10-CM

## 2021-06-06 DIAGNOSIS — M76.822 POSTERIOR TIBIAL TENDINITIS, LEFT: ICD-10-CM

## 2021-06-06 PROCEDURE — 73721 MRI JNT OF LWR EXTRE W/O DYE: CPT | Mod: LT | Performed by: RADIOLOGY

## 2021-06-14 ENCOUNTER — OFFICE VISIT (OUTPATIENT)
Dept: ORTHOPEDICS | Facility: CLINIC | Age: 21
End: 2021-06-14
Payer: COMMERCIAL

## 2021-06-14 DIAGNOSIS — S86.112A RUPTURE OF POSTERIOR TIBIALIS TENDON, LEFT, INITIAL ENCOUNTER: Primary | ICD-10-CM

## 2021-06-14 NOTE — LETTER
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  She had a specific event earlier this year when she injured her foot.  She was only able to complete 3 races.  She then felt that her foot was unstable and would collapse into a flatfoot position.  She is been wearing a boot.  Her pain is resolved largely.  She does not currently feel confident in her foot.  An MRI was recently obtained showing an avulsion of the posterior tib tendon from its insertion of the base of the navicular.  She was referred to my clinic for definitive management.    Examination today shows a normal foot.  Normal sensation and blood flow.  Normal foot motion.  Foot is pliable.  I do think there is a subtle difference side to side in recreation of her normal hindfoot varus with tiptoe standing.    MRI demonstrates an avulsion of her posterior tib tendon from its insertion of the navicular    Clinical assessment: Posterior tib tendon avulsion    Plan: I had a long discussion with the athlete.  I shared with her diagnosis.  I am going to discuss her case with one of our foot and ankle surgeons who I think may have more experience with this my general thought is that she may require a repair and or augmentation of this area.  I do not exactly know what that means for her upcoming track season so I think we will need to do some further investigation.  Patient understands that we will follow up with her in the next week or so as we get more information.      Sotero Osuna MD

## 2021-06-15 NOTE — PROGRESS NOTES
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  She had a specific event earlier this year when she injured her foot.  She was only able to complete 3 races.  She then felt that her foot was unstable and would collapse into a flatfoot position.  She is been wearing a boot.  Her pain is resolved largely.  She does not currently feel confident in her foot.  An MRI was recently obtained showing an avulsion of the posterior tib tendon from its insertion of the base of the navicular.  She was referred to my clinic for definitive management.    Examination today shows a normal foot.  Normal sensation and blood flow.  Normal foot motion.  Foot is pliable.  I do think there is a subtle difference side to side in recreation of her normal hindfoot varus with tiptoe standing.    MRI demonstrates an avulsion of her posterior tib tendon from its insertion of the navicular    Clinical assessment: Posterior tib tendon avulsion    Plan: I had a long discussion with the athlete.  I shared with her diagnosis.  I am going to discuss her case with one of our foot and ankle surgeons who I think may have more experience with this my general thought is that she may require a repair and or augmentation of this area.  I do not exactly know what that means for her upcoming track season so I think we will need to do some further investigation.  Patient understands that we will follow up with her in the next week or so as we get more information.

## 2021-08-18 DIAGNOSIS — Z13.6 SCREENING FOR HEART DISEASE: Primary | ICD-10-CM

## 2021-08-23 ENCOUNTER — LAB (OUTPATIENT)
Dept: LAB | Facility: CLINIC | Age: 21
End: 2021-08-23

## 2021-08-23 DIAGNOSIS — Z13.6 SCREENING FOR HEART DISEASE: ICD-10-CM

## 2021-08-23 LAB
ATRIAL RATE - MUSE: 85 BPM
DIASTOLIC BLOOD PRESSURE - MUSE: NORMAL MMHG
INTERPRETATION ECG - MUSE: NORMAL
P AXIS - MUSE: 83 DEGREES
PR INTERVAL - MUSE: 148 MS
QRS DURATION - MUSE: 82 MS
QT - MUSE: 346 MS
QTC - MUSE: 411 MS
R AXIS - MUSE: 73 DEGREES
SYSTOLIC BLOOD PRESSURE - MUSE: NORMAL MMHG
T AXIS - MUSE: 39 DEGREES
VENTRICULAR RATE- MUSE: 85 BPM

## 2021-08-24 ENCOUNTER — DOCUMENTATION ONLY (OUTPATIENT)
Dept: ORTHOPEDICS | Facility: CLINIC | Age: 21
End: 2021-08-24

## 2021-08-25 NOTE — PROGRESS NOTES
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  Findings are normal.      Additional follow up is not needed at this time.   Follow up tests: none    Anita Constantino is cleared to participate at this time.     Lilia Burnette MD

## 2021-09-18 ENCOUNTER — HEALTH MAINTENANCE LETTER (OUTPATIENT)
Age: 21
End: 2021-09-18

## 2021-10-12 DIAGNOSIS — R53.83 FATIGUE, UNSPECIFIED TYPE: Primary | ICD-10-CM

## 2021-10-14 ENCOUNTER — LAB (OUTPATIENT)
Dept: LAB | Facility: CLINIC | Age: 21
End: 2021-10-14
Payer: COMMERCIAL

## 2021-10-14 DIAGNOSIS — R53.83 FATIGUE, UNSPECIFIED TYPE: ICD-10-CM

## 2021-10-14 LAB
FERRITIN SERPL-MCNC: 43 NG/ML (ref 12–150)
HGB BLD-MCNC: 12.6 G/DL (ref 11.7–15.7)

## 2021-10-14 PROCEDURE — 82728 ASSAY OF FERRITIN: CPT | Performed by: PEDIATRICS

## 2021-10-14 PROCEDURE — 85018 HEMOGLOBIN: CPT | Performed by: PEDIATRICS

## 2022-01-04 ENCOUNTER — VIRTUAL VISIT (OUTPATIENT)
Dept: FAMILY MEDICINE | Facility: CLINIC | Age: 22
End: 2022-01-04
Payer: COMMERCIAL

## 2022-01-04 DIAGNOSIS — U07.1 CLINICAL DIAGNOSIS OF COVID-19: Primary | ICD-10-CM

## 2022-01-04 NOTE — LETTER
1/4/2022      RE: Anita Constantino  [address] [facility] [address]       Anita is a 21 year old who is being evaluated via a billable video visit.      How would you like to obtain your AVS? MyChart  If the video visit is dropped, the invitation should be resent by: Text to cell phone: per ATC  Will anyone else be joining your video visit? No      Video Start Time: 11:57 AM  Video-Visit Details    Type of service:  Video Visit    Video End Time:12:08    Originating Location (pt. 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    Tested positive on 1/1/22. She did an at home test before seeing extended family. She was not having any symtpoms at that time. She then did a confirmatory PCR test. She is now having slight sore throat and congestion. Feels like a minor cold. No fevers or chills, chest pain, shortness of breath, loss of taste, loss of smell, nausea, vomiting or diarrhea.    No previous COVID19.     Has been vaccinated with Moderna with booster on 12/20/21 also Moderna.     Athlete is currently in quarantine at home.     PMH, Medications and Allergies were reviewed and updated as needed.    ROS:  As noted above in HPI otherwise negative.    There is no problem list on file for this patient.      Current Outpatient Medications   Medication Sig Dispense Refill     cetirizine (ZYRTEC) 10 MG tablet Take 10 mg by mouth daily       clindamycin (CLINDAMAX) 1 % topical gel Apply topically 2 times daily       diclofenac (VOLTAREN) 75 MG EC tablet Take 1 tablet (75 mg) by mouth 2 times daily (Patient not taking: Reported on 6/4/2021) 40 tablet 0     methylPREDNISolone  "(MEDROL DOSEPAK) 4 MG tablet therapy pack Follow Package Directions (Patient not taking: Reported on 6/4/2021) 21 tablet 0     OMEPRAZOLE PO Take by mouth as needed                OBJECTIVE:     There were no vitals taken for this visit.  Estimated body mass index is 20.2 kg/m  as calculated from the following:    Height as of 6/4/21: 1.702 m (5' 7\").    Weight as of 6/4/21: 58.5 kg (129 lb).    GENERAL: Healthy, alert and no distress  EYES: Eyes grossly normal to inspection.  No discharge or erythema, or obvious scleral/conjunctival abnormalities.  RESP: No audible wheeze, cough, or visible cyanosis.  No visible retractions or increased work of breathing.    SKIN: Visible skin clear. No significant rash, abnormal pigmentation or lesions.  NEURO: Cranial nerves grossly intact.  Mentation and speech appropriate for age.  PSYCH: Mentation appears normal, affect normal/bright, judgement and insight intact, normal speech and appearance well-groomed.         ASSESSMENT & PLAN:      Diagnoses and all orders for this visit:    Clinical diagnosis of COVID-19: COVID19 in vaccinated and boosted athlete with minimal symptoms.   - Deemed safe to quarantine at home  - Following end of quarantine athlete will progress through return to play post COVID protocol  - Return to play cardiac testing will be performed after quarantine. Future orders placed   - EKG, Troponin    Return to clinic following above testing to review results. Return sooner if develops new or worsening symptoms.    Options for treatment and/or follow-up care were reviewed with the patient and , Janes Brantley ATC. Patient was actively involved in the decision making process. Patient verbalized understanding and was in agreement with the plan.    The patient was discussed with Dr.Suzanne JOURDAN Luu MD, CAQ, CCD    Geri Ann MD  Primary Care Sports Medicine Fellow            Attending Note:   I have discussed this patient and have reviewed the " clinical presentation and progress note with the fellow. I agree with the treatment plan as outlined. The plan was formulated with the fellow on the day of the patient's visit.    Frances Luu MD, CAQ, CCD  [facility]  Sports Medicine and Bone Health      Geri Ann MD

## 2022-01-04 NOTE — PROGRESS NOTES
Anita is a 21 year old who is being evaluated via a billable video visit.      How would you like to obtain your AVS? MyChart  If the video visit is dropped, the invitation should be resent by: Text to cell phone: per ATC  Will anyone else be joining your video visit? No      Video Start Time: 11:57 AM  Video-Visit Details    Type of service:  Video Visit    Video End Time:12:08    Originating Location (pt. 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    Tested positive on 1/1/22. She did an at home test before seeing extended family. She was not having any symtpoms at that time. She then did a confirmatory PCR test. She is now having slight sore throat and congestion. Feels like a minor cold. No fevers or chills, chest pain, shortness of breath, loss of taste, loss of smell, nausea, vomiting or diarrhea.    No previous COVID19.     Has been vaccinated with Moderna with booster on 12/20/21 also Moderna.     Athlete is currently in quarantine at home.     PMH, Medications and Allergies were reviewed and updated as needed.    ROS:  As noted above in HPI otherwise negative.    There is no problem list on file for this patient.      Current Outpatient Medications   Medication Sig Dispense Refill     cetirizine (ZYRTEC) 10 MG tablet Take 10 mg by mouth daily       clindamycin (CLINDAMAX) 1 % topical gel Apply topically 2 times daily       diclofenac (VOLTAREN) 75 MG EC tablet Take 1 tablet (75 mg) by mouth 2 times daily (Patient not taking: Reported on 6/4/2021) 40 tablet 0     methylPREDNISolone (MEDROL DOSEPAK) 4 MG tablet therapy pack Follow Package Directions (Patient not taking: Reported  "on 6/4/2021) 21 tablet 0     OMEPRAZOLE PO Take by mouth as needed                OBJECTIVE:     There were no vitals taken for this visit.  Estimated body mass index is 20.2 kg/m  as calculated from the following:    Height as of 6/4/21: 1.702 m (5' 7\").    Weight as of 6/4/21: 58.5 kg (129 lb).    GENERAL: Healthy, alert and no distress  EYES: Eyes grossly normal to inspection.  No discharge or erythema, or obvious scleral/conjunctival abnormalities.  RESP: No audible wheeze, cough, or visible cyanosis.  No visible retractions or increased work of breathing.    SKIN: Visible skin clear. No significant rash, abnormal pigmentation or lesions.  NEURO: Cranial nerves grossly intact.  Mentation and speech appropriate for age.  PSYCH: Mentation appears normal, affect normal/bright, judgement and insight intact, normal speech and appearance well-groomed.         ASSESSMENT & PLAN:      Diagnoses and all orders for this visit:    Clinical diagnosis of COVID-19: COVID19 in vaccinated and boosted athlete with minimal symptoms.   - Deemed safe to quarantine at home  - Following end of quarantine athlete will progress through return to play post COVID protocol  - Return to play cardiac testing will be performed after quarantine. Future orders placed   - EKG, Troponin    Return to clinic following above testing to review results. Return sooner if develops new or worsening symptoms.    Options for treatment and/or follow-up care were reviewed with the patient and , Janes Brantley ATC. Patient was actively involved in the decision making process. Patient verbalized understanding and was in agreement with the plan.    The patient was discussed with Dr.Suzanne JOURDAN Luu MD, CAQ, CCD    Geri Ann MD  Primary Care Sports Medicine Fellow          "

## 2022-01-07 ENCOUNTER — OFFICE VISIT (OUTPATIENT)
Dept: FAMILY MEDICINE | Facility: CLINIC | Age: 22
End: 2022-01-07
Payer: COMMERCIAL

## 2022-01-07 ENCOUNTER — LAB (OUTPATIENT)
Dept: LAB | Facility: CLINIC | Age: 22
End: 2022-01-07
Payer: COMMERCIAL

## 2022-01-07 VITALS
HEIGHT: 67 IN | DIASTOLIC BLOOD PRESSURE: 82 MMHG | SYSTOLIC BLOOD PRESSURE: 124 MMHG | BODY MASS INDEX: 21.19 KG/M2 | WEIGHT: 135 LBS | HEART RATE: 81 BPM

## 2022-01-07 DIAGNOSIS — U07.1 CLINICAL DIAGNOSIS OF COVID-19: ICD-10-CM

## 2022-01-07 DIAGNOSIS — U07.1 CLINICAL DIAGNOSIS OF COVID-19: Primary | ICD-10-CM

## 2022-01-07 LAB
ATRIAL RATE - MUSE: 76 BPM
DIASTOLIC BLOOD PRESSURE - MUSE: NORMAL MMHG
INTERPRETATION ECG - MUSE: NORMAL
P AXIS - MUSE: 76 DEGREES
PR INTERVAL - MUSE: 148 MS
QRS DURATION - MUSE: 78 MS
QT - MUSE: 368 MS
QTC - MUSE: 414 MS
R AXIS - MUSE: 69 DEGREES
SYSTOLIC BLOOD PRESSURE - MUSE: NORMAL MMHG
T AXIS - MUSE: 49 DEGREES
TROPONIN I SERPL HS-MCNC: 43 NG/L
VENTRICULAR RATE- MUSE: 76 BPM

## 2022-01-07 PROCEDURE — 84484 ASSAY OF TROPONIN QUANT: CPT | Performed by: PATHOLOGY

## 2022-01-07 PROCEDURE — 36415 COLL VENOUS BLD VENIPUNCTURE: CPT | Performed by: PATHOLOGY

## 2022-01-07 ASSESSMENT — MIFFLIN-ST. JEOR: SCORE: 1404.99

## 2022-01-07 NOTE — LETTER
Date:January 10, 2022      Patient was self referred, no letter generated. Do not send.        [facility] Health Information

## 2022-01-07 NOTE — PROGRESS NOTES
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 Tested positive on 1/1/22. During illness patient experienced no symptoms except a sore throat. Denies having any symptoms of fever, night sweats, shortness of breath, wheezing, chest pain, cough, loss of taste, loss of smell, nausea, vomiting or diarrhea.Their course is resolved.   PMH, Medications and Allergies were reviewed and updated as needed.  Patient is vaccinated and boosted against Covid    ROS:  As noted above in HPI otherwise negative.    There is no problem list on file for this patient.  c    Current Outpatient Medications   Medication Sig Dispense Refill     cetirizine (ZYRTEC) 10 MG tablet Take 10 mg by mouth daily       clindamycin (CLINDAMAX) 1 % topical gel Apply topically 2 times daily       diclofenac (VOLTAREN) 75 MG EC tablet Take 1 tablet (75 mg) by mouth 2 times daily (Patient not taking: Reported on 6/4/2021) 40 tablet 0     methylPREDNISolone (MEDROL DOSEPAK) 4 MG tablet therapy pack Follow Package Directions (Patient not taking: Reported on 6/4/2021) 21 tablet 0     OMEPRAZOLE PO Take by mouth as needed                OBJECTIVE:     /82   Pulse 81   Ht 1.702 m (5' 7\")   Wt 61.2 kg (135 lb)   BMI 21.14 kg/m    Estimated body mass index is 21.14 kg/m  as calculated from the following:    Height as of this encounter: 1.702 m (5' 7\").    Weight as of this encounter: 61.2 kg (135 lb).    GENERAL: Healthy, alert and no distress  EYES: Eyes grossly normal to inspection.  No discharge or erythema, or obvious scleral/conjunctival abnormalities.  Cardiac: Regular rate and rhythm. No murmer, rubs, or gallops.  RESP: No wheeze, rhonchi or rales. No cough, or visible cyanosis.  No visible retractions or increased work of breathing.    SKIN: Visible skin " clear. No significant rash, abnormal pigmentation or lesions.  NEURO: Cranial nerves grossly intact.  Mentation and speech appropriate for age.  PSYCH: Mentation appears normal, affect normal/bright, judgement and insight intact, normal speech and appearance well-groomed.    Troponin <0.015   EKG: Normal Sinus Rhythm. No ST or T wave abnormalities           ASSESSMENT & PLAN:      22 yo vaccinated female s/p Covid infection  Feels well      Anita Constantino was seen today after completing isolation after shyam COVID-19.     Cardiac screening tests were reviewed: normal EKG, troponin     The [facility] return to play protocol may begin under the supervision of      Discuss the continued need for wearing a mask, handwashing, and social distancing, as there is the possibility that the disease can still be transmitted. We also discussed the relative unknown with regards to antibody and immunity status for the future.     Return to clinic if new symptoms develop.    Ananth Soliz MD

## 2022-01-07 NOTE — LETTER
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 Tested positive on 1/1/22. During illness patient experienced no symptoms except a sore throat. Denies having any symptoms of fever, night sweats, shortness of breath, wheezing, chest pain, cough, loss of taste, loss of smell, nausea, vomiting or diarrhea.Their course is resolved.   PMH, Medications and Allergies were reviewed and updated as needed.  Patient is vaccinated and boosted against Covid    ROS:  As noted above in HPI otherwise negative.    There is no problem list on file for this patient.  c    Current Outpatient Medications   Medication Sig Dispense Refill     cetirizine (ZYRTEC) 10 MG tablet Take 10 mg by mouth daily       clindamycin (CLINDAMAX) 1 % topical gel Apply topically 2 times daily       diclofenac (VOLTAREN) 75 MG EC tablet Take 1 tablet (75 mg) by mouth 2 times daily (Patient not taking: Reported on 6/4/2021) 40 tablet 0     methylPREDNISolone (MEDROL DOSEPAK) 4 MG tablet therapy pack Follow Package Directions (Patient not taking: Reported on 6/4/2021) 21 tablet 0     OMEPRAZOLE PO Take by mouth as needed                OBJECTIVE:     /82   Pulse 81   Ht 1.702 m (5' 7\")   Wt 61.2 kg (135 lb)   BMI 21.14 kg/m    Estimated body mass index is 21.14 kg/m  as calculated from the following:    Height as of this encounter: 1.702 m (5' 7\").    Weight as of this encounter: 61.2 kg (135 lb).    GENERAL: Healthy, alert and no distress  EYES: Eyes grossly normal to inspection.  No discharge or erythema, or obvious scleral/conjunctival abnormalities.  Cardiac: Regular rate and rhythm. No murmer, rubs, or gallops.  RESP: No wheeze, rhonchi or rales. No cough, or " visible cyanosis.  No visible retractions or increased work of breathing.    SKIN: Visible skin clear. No significant rash, abnormal pigmentation or lesions.  NEURO: Cranial nerves grossly intact.  Mentation and speech appropriate for age.  PSYCH: Mentation appears normal, affect normal/bright, judgement and insight intact, normal speech and appearance well-groomed.    Troponin <0.015   EKG: Normal Sinus Rhythm. No ST or T wave abnormalities           ASSESSMENT & PLAN:      20 yo vaccinated female s/p Covid infection  Feels well      Anita Constantino was seen today after completing isolation after shyam COVID-19.     Cardiac screening tests were reviewed: normal EKG, troponin     The [facility] return to play protocol may begin under the supervision of      Discuss the continued need for wearing a mask, handwashing, and social distancing, as there is the possibility that the disease can still be transmitted. We also discussed the relative unknown with regards to antibody and immunity status for the future.     Return to clinic if new symptoms develop.    MD Ananth Macdonald MD

## 2022-01-08 ENCOUNTER — HEALTH MAINTENANCE LETTER (OUTPATIENT)
Age: 22
End: 2022-01-08

## 2022-01-09 NOTE — PROGRESS NOTES
Attending Note:   I have discussed this patient and have reviewed the clinical presentation and progress note with the fellow. I agree with the treatment plan as outlined. The plan was formulated with the fellow on the day of the patient's visit.    Frances Luu MD, CAQ, CCD  [facility]  Sports Medicine and [facility]

## 2022-11-19 ENCOUNTER — HEALTH MAINTENANCE LETTER (OUTPATIENT)
Age: 22
End: 2022-11-19

## 2024-01-28 ENCOUNTER — HEALTH MAINTENANCE LETTER (OUTPATIENT)
Age: 24
End: 2024-01-28